# Patient Record
Sex: FEMALE | Race: NATIVE HAWAIIAN OR OTHER PACIFIC ISLANDER
[De-identification: names, ages, dates, MRNs, and addresses within clinical notes are randomized per-mention and may not be internally consistent; named-entity substitution may affect disease eponyms.]

---

## 2019-10-29 ENCOUNTER — HOSPITAL ENCOUNTER (EMERGENCY)
Dept: HOSPITAL 5 - ED | Age: 39
Discharge: HOME | End: 2019-10-29
Payer: SELF-PAY

## 2019-10-29 VITALS — SYSTOLIC BLOOD PRESSURE: 148 MMHG | DIASTOLIC BLOOD PRESSURE: 92 MMHG
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
## 2019-10-29 DIAGNOSIS — S61.411D: Primary | ICD-10-CM

## 2019-10-29 NOTE — EMERGENCY DEPARTMENT REPORT
Suture/Staple Removal





- [facility]


Chief Complaint: Laceration/Recheck/Suture


Stated Complaint: SUTURE REMOVAL


Time Seen by Provider: 10/29/19 12:35


When Sutures or Staples Placed: 8-10 Days Ago


Wound Location: right hand





ED Review of Systems


ROS: 


Stated complaint: SUTURE REMOVAL


Other details as noted in HPI





Constitutional: denies: chills, fever


Eyes: denies: eye pain, eye discharge, vision change


ENT: denies: ear pain, throat pain


Respiratory: denies: cough, shortness of breath, wheezing


Cardiovascular: denies: chest pain, palpitations


Endocrine: no symptoms reported


Gastrointestinal: denies: abdominal pain, nausea, diarrhea


Genitourinary: denies: urgency, dysuria, discharge


Musculoskeletal: denies: back pain, joint swelling, arthralgia


Skin: denies: rash, lesions


Neurological: denies: headache, weakness, paresthesias


Psychiatric: denies: anxiety, depression


Hematological/Lymphatic: denies: easy bleeding, easy bruising





ED Past Medical Hx





- Past Medical History


Previous Medical History?: No





- Surgical History


Past Surgical History?: No





- Social History


Smoking Status: Never Smoker


Substance Use Type: None





Suture Removal Exam





- Exam


General: 


Vital signs noted. No distress. Alert and acting appropriately.





Wound: No Pathologic Erythema, No Tenderness, No Drainage, No Pus, No Wound 

Dehiscence


Other Systems: 


All other systems reviewed and are unremarkable.








ED Course


                                   Vital Signs











  10/29/19





  12:42


 


Temperature 98.9 F


 


Pulse Rate 89


 


Respiratory 16





Rate 


 


Blood Pressure 148/92


 


O2 Sat by Pulse 100





Oximetry 














- Reevaluation(s)


Reevaluation #1: 





10/29/19 12:43


Patient is speaking in full sentences with no signs of distress noted.





ED Recheck MDM





- Medical Decision Making





total of 3 suture removed.  Patient tolerated well.  Normal healing.  Patient 

was instructed to Follow-up with a primary care doctor in 3-5 days or if 

symptoms worsen and continue return to emergency room as soon as possible.  At 

time of discharge, the patient does not seem toxic or ill in appearance.  No 

acute signs of distress noted.  Patient agrees to discharge treatment plan of 

care.  No further questions noted by the patient.





Critical care attestation.: 


If time is entered above; I have spent that time in minutes in the direct care 

of this critically ill patient, excluding procedure time.








ED Disposition


Clinical Impression: 


 Visit for suture removal





Disposition: DC-01 TO HOME OR SELFCARE


Is pt being admited?: No


Does the pt Need Aspirin: No


Condition: Stable


Instructions:  Suture Removal (ED)


Additional Instructions: 


 Follow-up with a primary care doctor in 3-5 days or if symptoms worsen and 

continue return to emergency room as soon as possible.  


Referrals: 


PRIMARY CARE,MD [Referring] - 3-5 Days


JULIA CURRIE MD [Staff Physician] - 3-5 Days


[facility] [Outside] - 3-5 Days


[facility] [Outside] - 3-5 Days


Forms:  Work/School Release Form(ED)

## 2021-06-04 ENCOUNTER — HOSPITAL ENCOUNTER (INPATIENT)
Dept: HOSPITAL 5 - TRG | Age: 41
LOS: 1 days | Discharge: HOME | End: 2021-06-05
Attending: OBSTETRICS & GYNECOLOGY | Admitting: OBSTETRICS & GYNECOLOGY
Payer: COMMERCIAL

## 2021-06-04 DIAGNOSIS — O36.4XX0: Primary | ICD-10-CM

## 2021-06-04 DIAGNOSIS — Z37.1: ICD-10-CM

## 2021-06-04 DIAGNOSIS — Z3A.20: ICD-10-CM

## 2021-06-04 DIAGNOSIS — Z20.822: ICD-10-CM

## 2021-06-04 LAB
BACTERIA #/AREA URNS HPF: (no result) /HPF
BASOPHILS # (AUTO): 0 K/MM3 (ref 0–0.1)
BASOPHILS NFR BLD AUTO: 0.5 % (ref 0–1.8)
BILIRUB UR QL STRIP: (no result)
BLOOD UR QL VISUAL: (no result)
EOSINOPHIL # BLD AUTO: 0.2 K/MM3 (ref 0–0.4)
EOSINOPHIL NFR BLD AUTO: 1.9 % (ref 0–4.3)
HCT VFR BLD CALC: 37.7 % (ref 30.3–42.9)
HGB BLD-MCNC: 13.4 GM/DL (ref 10.1–14.3)
LYMPHOCYTES # BLD AUTO: 2.4 K/MM3 (ref 1.2–5.4)
LYMPHOCYTES NFR BLD AUTO: 27.1 % (ref 13.4–35)
MCHC RBC AUTO-ENTMCNC: 36 % (ref 30–34)
MCV RBC AUTO: 96 FL (ref 79–97)
MONOCYTES # (AUTO): 0.7 K/MM3 (ref 0–0.8)
MONOCYTES % (AUTO): 7.9 % (ref 0–7.3)
MUCOUS THREADS #/AREA URNS HPF: (no result) /HPF
PH UR STRIP: 7 [PH] (ref 5–7)
PLATELET # BLD: 378 K/MM3 (ref 140–440)
PROT UR STRIP-MCNC: (no result) MG/DL
RBC # BLD AUTO: 3.93 M/MM3 (ref 3.65–5.03)
RBC #/AREA URNS HPF: 26 /HPF (ref 0–6)
UROBILINOGEN UR-MCNC: < 2 MG/DL (ref ?–2)
WBC #/AREA URNS HPF: 4 /HPF (ref 0–6)

## 2021-06-04 PROCEDURE — 80307 DRUG TEST PRSMV CHEM ANLYZR: CPT

## 2021-06-04 PROCEDURE — 85025 COMPLETE CBC W/AUTO DIFF WBC: CPT

## 2021-06-04 PROCEDURE — U0003 INFECTIOUS AGENT DETECTION BY NUCLEIC ACID (DNA OR RNA); SEVERE ACUTE RESPIRATORY SYNDROME CORONAVIRUS 2 (SARS-COV-2) (CORONAVIRUS DISEASE [COVID-19]), AMPLIFIED PROBE TECHNIQUE, MAKING USE OF HIGH THROUGHPUT TECHNOLOGIES AS DESCRIBED BY CMS-2020-01-R: HCPCS

## 2021-06-04 PROCEDURE — 86592 SYPHILIS TEST NON-TREP QUAL: CPT

## 2021-06-04 PROCEDURE — 85014 HEMATOCRIT: CPT

## 2021-06-04 PROCEDURE — 88305 TISSUE EXAM BY PATHOLOGIST: CPT

## 2021-06-04 PROCEDURE — 86901 BLOOD TYPING SEROLOGIC RH(D): CPT

## 2021-06-04 PROCEDURE — 85018 HEMOGLOBIN: CPT

## 2021-06-04 PROCEDURE — 86803 HEPATITIS C AB TEST: CPT

## 2021-06-04 PROCEDURE — 86850 RBC ANTIBODY SCREEN: CPT

## 2021-06-04 PROCEDURE — 81001 URINALYSIS AUTO W/SCOPE: CPT

## 2021-06-04 PROCEDURE — 86706 HEP B SURFACE ANTIBODY: CPT

## 2021-06-04 PROCEDURE — 86762 RUBELLA ANTIBODY: CPT

## 2021-06-04 PROCEDURE — 87806 HIV AG W/HIV1&2 ANTB W/OPTIC: CPT

## 2021-06-04 PROCEDURE — 86900 BLOOD TYPING SEROLOGIC ABO: CPT

## 2021-06-04 PROCEDURE — 36415 COLL VENOUS BLD VENIPUNCTURE: CPT

## 2021-06-04 PROCEDURE — 76815 OB US LIMITED FETUS(S): CPT

## 2021-06-04 NOTE — ULTRASOUND REPORT
US OB limited



INDICATION / CLINICAL INFORMATION:

Confirm fetal demise.



COMPARISON:

None available.



FINDINGS:

Fetal lie is currently breech.



No embryonic cardiac activity was detected.



IMPRESSION:

1. Limited study. No embryonic cardiac activity was detected. Fetal lie is currently breech. 



Signer Name: Rojelio Lang MD 

Signed: 6/4/2021 6:48 PM

Workstation Name: [id number]

## 2021-06-05 VITALS — SYSTOLIC BLOOD PRESSURE: 109 MMHG | DIASTOLIC BLOOD PRESSURE: 66 MMHG

## 2021-06-05 LAB
HCT VFR BLD CALC: 35.2 % (ref 30.3–42.9)
HGB BLD-MCNC: 12 GM/DL (ref 10.1–14.3)

## 2021-06-05 PROCEDURE — 3E0DXGC INTRODUCTION OF OTHER THERAPEUTIC SUBSTANCE INTO MOUTH AND PHARYNX, EXTERNAL APPROACH: ICD-10-PCS | Performed by: OBSTETRICS & GYNECOLOGY

## 2021-06-05 NOTE — PROGRESS NOTE
Assessment and Plan


A: 41 y.o. @ 20.6 wks IUFD. Cervical exam: 0/0/-3. Cytotec 400mcg placed 

vaginally. 











- Patient Problems


(1) IUFD at 20 weeks or more of gestation


Onset Date: ~06/04/21   Current Visit: Yes   Status: Acute   


Plan to address problem: 


Continue with Cytotec IOL. 








Subjective





- Subjective


Date of service: 06/05/21 (Pt states does not want pain medication.)


Principal diagnosis: IUFD @ 20.6 wks


Patient reports: no new complaints, no loss of fluid, no vaginal bleeding, no 

contractions





Objective





- Vital Signs


Vital Signs: 


                               Vital Signs - 12hr











  06/04/21 06/04/21 06/04/21





  17:33 17:34 17:38


 


Temperature  98.4 F 


 


Pulse Rate 83 87 94 H


 


Respiratory  16 





Rate   


 


Blood Pressure  129/73 


 


Blood Pressure  129/73 





[Right]   


 


O2 Sat by Pulse 100 100 100





Oximetry   














  06/04/21 06/04/21 06/04/21





  17:43 17:48 17:53


 


Temperature   


 


Pulse Rate 82 89 78


 


Respiratory   





Rate   


 


Blood Pressure   


 


Blood Pressure   





[Right]   


 


O2 Sat by Pulse 100 100 100





Oximetry   














  06/04/21 06/04/21 06/04/21





  17:58 18:03 18:08


 


Temperature   


 


Pulse Rate 84 86 89


 


Respiratory   





Rate   


 


Blood Pressure   


 


Blood Pressure   





[Right]   


 


O2 Sat by Pulse 100 100 100





Oximetry   














  06/04/21 06/04/21 06/04/21





  18:13 18:18 18:23


 


Temperature   


 


Pulse Rate 91 H 86 87


 


Respiratory   





Rate   


 


Blood Pressure   


 


Blood Pressure   





[Right]   


 


O2 Sat by Pulse 100 100 100





Oximetry   














  06/04/21 06/04/21 06/04/21





  18:28 18:33 18:38


 


Temperature   


 


Pulse Rate 96 H 87 83


 


Respiratory   





Rate   


 


Blood Pressure   


 


Blood Pressure   





[Right]   


 


O2 Sat by Pulse 100 100 100





Oximetry   














  06/05/21





  04:06


 


Temperature 


 


Pulse Rate 78


 


Respiratory 





Rate 


 


Blood Pressure 123/60


 


Blood Pressure 





[Right] 


 


O2 Sat by Pulse 





Oximetry 














- Exam


Narrative Exam: 


Pt declines pain medication at this time. Discussed pain medication available if

 she needs it. Pt verbalized understanding. Cervical exam unchanged: 0/0/-4. 

400mcg Cytotec (second dose) placed @ 0416am. 





Breasts: deferred


Cardiovascular: Regular rate


Lungs: Normal air movement


Abdomen: Present: normal appearance, soft


Vulva: both: normal


Cervical Dilatation: 0


Cervical Effacement Percentage: 0


Fetal station: -3





- Labs


Labs: 


                                  Abnormal Labs











  06/04/21





  17:50


 


MCH  34 H


 


MCHC  36 H


 


RDW  12.8 L


 


Mono % (Auto)  7.9 H








                         Laboratory Results - last 24 hr











  06/04/21 06/04/21 06/04/21





  17:50 17:50 17:50


 


WBC  9.0  


 


RBC  3.93  


 


Hgb  13.4  


 


Hct  37.7  


 


MCV  96  


 


MCH  34 H  


 


MCHC  36 H  


 


RDW  12.8 L  


 


Plt Count  378  


 


Lymph % (Auto)  27.1  


 


Mono % (Auto)  7.9 H  


 


Eos % (Auto)  1.9  


 


Baso % (Auto)  0.5  


 


Lymph # (Auto)  2.4  


 


Mono # (Auto)  0.7  


 


Eos # (Auto)  0.2  


 


Baso # (Auto)  0.0  


 


Seg Neutrophils %  62.6  


 


Seg Neutrophils #  5.6  


 


Urine Color   


 


Urine Turbidity   


 


Urine pH   


 


Ur Specific Gravity   


 


Urine Protein   


 


Urine Glucose (UA)   


 


Urine Ketones   


 


Urine Blood   


 


Urine Nitrite   


 


Urine Bilirubin   


 


Urine Urobilinogen   


 


Ur Leukocyte Esterase   


 


Urine WBC (Auto)   


 


Urine RBC (Auto)   


 


U Epithel Cells (Auto)   


 


Urine Bacteria (Auto)   


 


Urine Mucus   


 


Urine Yeast (Budding)   


 


Urine Opiates Screen   


 


Urine Methadone Screen   


 


Ur Barbiturates Screen   


 


Ur Phencyclidine Scrn   


 


Ur Amphetamines Screen   


 


U Benzodiazepines Scrn   


 


Urine Cocaine Screen   


 


U Marijuana (THC) Screen   


 


Drugs of Abuse Note   


 


Syphilis IgG Antibody   Nonreactive 


 


Hep Bs Antigen    Non-reactive


 


Hepatitis C Antibody   Non-reactive 


 


HIV 1&2 Antibody Rapid  Non react  


 


HIV P24 Antigen  Non react  


 


Rubella IgG Antibody   Immune 














  06/04/21 06/04/21





  Unknown Unknown


 


WBC  


 


RBC  


 


Hgb  


 


Hct  


 


MCV  


 


MCH  


 


MCHC  


 


RDW  


 


Plt Count  


 


Lymph % (Auto)  


 


Mono % (Auto)  


 


Eos % (Auto)  


 


Baso % (Auto)  


 


Lymph # (Auto)  


 


Mono # (Auto)  


 


Eos # (Auto)  


 


Baso # (Auto)  


 


Seg Neutrophils %  


 


Seg Neutrophils #  


 


Urine Color  Yellow 


 


Urine Turbidity  Slightly-cloudy 


 


Urine pH  7.0 


 


Ur Specific Gravity  1.004 


 


Urine Protein  <15 mg/dl 


 


Urine Glucose (UA)  Neg 


 


Urine Ketones  Tr 


 


Urine Blood  Neg 


 


Urine Nitrite  Neg 


 


Urine Bilirubin  Neg 


 


Urine Urobilinogen  < 2.0 


 


Ur Leukocyte Esterase  Lg 


 


Urine WBC (Auto)  4.0 


 


Urine RBC (Auto)  26.0 


 


U Epithel Cells (Auto)  9.0 


 


Urine Bacteria (Auto)  2+ 


 


Urine Mucus  Few 


 


Urine Yeast (Budding)  1+ 


 


Urine Opiates Screen   Negative


 


Urine Methadone Screen   Negative


 


Ur Barbiturates Screen   Negative


 


Ur Phencyclidine Scrn   Negative


 


Ur Amphetamines Screen   Negative


 


U Benzodiazepines Scrn   Negative


 


Urine Cocaine Screen   Negative


 


U Marijuana (THC) Screen   Negative


 


Drugs of Abuse Note   Disclamer


 


Syphilis IgG Antibody  


 


Hep Bs Antigen  


 


Hepatitis C Antibody  


 


HIV 1&2 Antibody Rapid  


 


HIV P24 Antigen  


 


Rubella IgG Antibody

## 2021-06-05 NOTE — PROCEDURE NOTE
OB Delivery Note





- Delivery


Date of Delivery: 21 (Male infant demise)


Assistant: CHERISE MARK


Estimated blood loss: <100cc





- Vaginal


Delivery presentation: breech


Intrapartum events: other(please specify) (IUFD)


Delivery induction: misoprostol


Delivery monitor: external uterine


Route of delivery: 


Delivery placenta: spontaneous


Delivery cord: 3 umbilical vessels


Episiotomy: none


Delivery laceration: none


Anesthesia: none


Delivery comments: 


Called while in route to hospital that nonviable male baby had spontaneously 

delivered. After my arrival, Placenta spontaneously delivered intact and 

complete. Bleeding scant. perineum intact. Apgars 0/0, EBL 50. Mother LDR 

stable. 








- Infant


  ** A


Apgar at 1 minute: 0


Apgar at 5 minutes: 0


Infant Gender: Male

## 2021-06-05 NOTE — DISCHARGE SUMMARY
Providers





- Providers


Date of Admission: 


21 18:30





Date of discharge: 21 (pt desires d/c hme)


Attending physician: 


CAMERON ARMSTRONG





Primary care physician: 


PRIMARY CARE MD








Hospitalization


Reason for admission: IUFD


Delivery: 


Episiotomy: none


Laceration: none


Other postpartum procedures: none


Postpartum complications: none


Discharge diagnosis: other (nonviable  demise prior to admission to labor

and delivery)


[place] baby: male


Hospital course: 


uncomplicated IOL and delivery of nonviable  male IUFD





Condition at discharge: Good


Disposition: DC-01 TO HOME OR SELFCARE





- Discharge Diagnoses


(1)  (spontaneous vaginal delivery)


Status: Acute   





(2) IUFD at 20 weeks or more of gestation


Status: Acute   





Plan





- Provider Discharge Summary


Activity: routine, no sex for 6 weeks, no heavy lifting 4 weeks, no strenuous 

exercise


Diet: routine


Instructions: routine


Additional instructions: 


[]  Smoking cessation referral if applicable(refer to patient education folder 

for contact #)


[]  Refer to [facility] Booklet








Call your doctor immediately for:


* Fever > 100.5


* Heavy vaginal bleeding ( >1 pad per hour)


* Severe persistent headache


* Shortness of breath


* Reddened, hot, painful area to leg or breast


* Drainage or odor from incision.





* Keep incision clean and dry at all times and follow doctor's instructions 

regarding bathing/showering











- Follow up plan


Follow up: 


PRIMARY CARE,MD [Primary Care Provider] - 7 Days


DIMPLE BINGHAM MD [Staff Physician] - 21 (Our condolences on your 

loss. Please call [phone] to schedule your follow up visit in 4 weeks. You 

may also keep your scheduled follow up appointment with your clinic.  Call with 

any questions or concerns.)